# Patient Record
Sex: FEMALE | Race: WHITE | HISPANIC OR LATINO | URBAN - METROPOLITAN AREA
[De-identification: names, ages, dates, MRNs, and addresses within clinical notes are randomized per-mention and may not be internally consistent; named-entity substitution may affect disease eponyms.]

---

## 2017-05-22 ENCOUNTER — EMERGENCY (EMERGENCY)
Facility: HOSPITAL | Age: 62
LOS: 1 days | Discharge: HOME | End: 2017-05-22
Attending: EMERGENCY MEDICINE
Payer: COMMERCIAL

## 2017-05-22 VITALS
WEIGHT: 179.9 LBS | RESPIRATION RATE: 16 BRPM | HEART RATE: 95 BPM | SYSTOLIC BLOOD PRESSURE: 124 MMHG | HEIGHT: 60 IN | TEMPERATURE: 98 F | OXYGEN SATURATION: 98 % | DIASTOLIC BLOOD PRESSURE: 47 MMHG

## 2017-05-22 DIAGNOSIS — S16.1XXA STRAIN OF MUSCLE, FASCIA AND TENDON AT NECK LEVEL, INITIAL ENCOUNTER: ICD-10-CM
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
## 2017-05-22 DIAGNOSIS — E11.9 TYPE 2 DIABETES MELLITUS WITHOUT COMPLICATIONS: ICD-10-CM
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
## 2017-05-22 PROCEDURE — 72110 X-RAY EXAM L-2 SPINE 4/>VWS: CPT | Mod: 26

## 2017-05-22 PROCEDURE — 99284 EMERGENCY DEPT VISIT MOD MDM: CPT

## 2017-05-22 PROCEDURE — 73140 X-RAY EXAM OF FINGER(S): CPT

## 2017-05-22 PROCEDURE — 73140 X-RAY EXAM OF FINGER(S): CPT | Mod: 26,LT

## 2017-05-22 PROCEDURE — 72110 X-RAY EXAM L-2 SPINE 4/>VWS: CPT

## 2017-05-22 RX ORDER — IBUPROFEN 200 MG
1 TABLET ORAL
Qty: 20 | Refills: 0 | OUTPATIENT
Start: 2017-05-22 | End: 2017-05-27

## 2017-05-22 RX ORDER — IBUPROFEN 200 MG
600 TABLET ORAL ONCE
Qty: 0 | Refills: 0 | Status: COMPLETED | OUTPATIENT
Start: 2017-05-22 | End: 2017-05-22

## 2017-05-22 RX ORDER — METHOCARBAMOL 500 MG/1
1 TABLET, FILM COATED ORAL
Qty: 12 | Refills: 0 | OUTPATIENT
Start: 2017-05-22 | End: 2017-05-26

## 2017-05-22 RX ORDER — METHOCARBAMOL 500 MG/1
750 TABLET, FILM COATED ORAL ONCE
Qty: 0 | Refills: 0 | Status: COMPLETED | OUTPATIENT
Start: 2017-05-22 | End: 2017-05-22

## 2017-05-22 RX ADMIN — Medication 600 MILLIGRAM(S): at 19:57

## 2017-05-22 RX ADMIN — Medication 600 MILLIGRAM(S): at 19:07

## 2017-05-22 RX ADMIN — METHOCARBAMOL 750 MILLIGRAM(S): 500 TABLET, FILM COATED ORAL at 19:08
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
## 2017-05-22 NOTE — ED PROVIDER NOTE - PHYSICAL EXAMINATION
Left lateral neck and trapezius TTP. No midline cervical TTP. full range of motion of neck. Full range of motion of bilateral shoulders. Lumbar midline TTP and bilateral muscle TTP/tenseness. full range of motion of both elbows and wrists. No snuffbox ttp. Left hand 3rd digit PIP TTP. Left knee full range of motion without bony TTP. No signs of head injury.
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
## 2017-05-22 NOTE — ED PROVIDER NOTE - PROGRESS NOTE DETAILS
Xray finger show no fracture. Xray lower back shows degenerative changes, no acute fracture. Patient is well-appearing, ambulatory. Will discharge with PMD follow up and IBU/RObaxin rx. Pt is well appearing walking with steady gait, stable for discharge and follow up without fail with medical doctor. I had a detailed discussion with the patient and/or guardian regarding the historical points, exam findings, and any diagnostic results supporting the discharge diagnosis. Pt educated on care and need for follow up. Strict return instructions and red flag signs and symptoms discussed with patient. Questions answered. Pt shows understanding of discharge information and agrees to follow.
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
## 2017-07-17 NOTE — ED PROVIDER NOTE - NS ED ATTENDING STATEMENT MOD
Normal for race I have personally performed a face to face diagnostic evaluation on this patient. I have reviewed the NP note and agree with the history, exam, and plan of care, except as noted.

## 2020-09-23 NOTE — ED PROVIDER NOTE - CPE EDP EYES NORM
normal... Glycopyrrolate Counseling:  I discussed with the patient the risks of glycopyrrolate including but not limited to skin rash, drowsiness, dry mouth, difficulty urinating, and blurred vision.

## 2021-09-10 NOTE — ED ADULT TRIAGE NOTE - AS O2 DELIVERY
Refill request for pravastatin (PRAVACHOL) 40 MG tablet. If appropriate please send medication(s) to patients pharmacy.     Next appt: 10/12/2021 - Stephen Chavira  Last appt: 3/29/2021    Health Maintenance   Topic Date Due    COVID-19 Vaccine (1) Never done    Cervical cancer screen  Never done    Breast cancer screen  01/30/2019    Potassium monitoring  07/17/2021    Creatinine monitoring  07/17/2021    Flu vaccine (1) 09/01/2021    Shingles Vaccine (1 of 2) 09/29/2021 (Originally 4/12/2008)    Colon cancer screen colonoscopy  03/16/2022    A1C test (Diabetic or Prediabetic)  03/29/2022    Lipid screen  03/29/2022    Pneumococcal 0-64 years Vaccine (2 of 2 - PPSV23) 04/12/2023    DTaP/Tdap/Td vaccine (2 - Td or Tdap) 09/08/2024    Hepatitis C screen  Completed    HIV screen  Completed    Hepatitis A vaccine  Aged Out    Hepatitis B vaccine  Aged Out    Hib vaccine  Aged Out    Meningococcal (ACWY) vaccine  Aged Out       Hemoglobin A1C (%)   Date Value   03/29/2021 5.7   02/10/2020 5.8   05/30/2019 6.0             ( goal A1C is < 7)   No results found for: LABMICR  LDL Cholesterol (mg/dL)   Date Value   03/29/2021 114       (goal LDL is <100)   AST (U/L)   Date Value   07/17/2020 19     ALT (U/L)   Date Value   07/17/2020 15     BUN (mg/dL)   Date Value   07/17/2020 20     BP Readings from Last 3 Encounters:   03/29/21 (!) 151/88   07/17/20 (!) 174/93   05/18/20 (!) 165/90          (goal 120/80)          Patient Active Problem List:     Essential hypertension     Pure hypercholesterolemia     Mild intermittent asthma without complication     Pre-diabetes room air